# Patient Record
(demographics unavailable — no encounter records)

---

## 2024-11-22 NOTE — ASSESSMENT
[FreeTextEntry1] : Patient is a 32 F with history of PCOS, T2D here for follow up.  # T2D   - goal is <6-6.5 for preconception patient  - A1c 8.5% 2024--> 6.1 2024 - Complications:  none - Aspirin: no - Most recent urine microalbumin   negative 2024   . ACE-I/ARB: no - Most recent LDL: 110   . On statin: NO  - Opthalmology up to date: no,  advised for yearly check up - Podiatry up to date:no advised for yearly check up - Patient to call for persistent glucose < 70 or > 300 - Patient counseled on the importance of consistent carbohydrate diet and regular physical activity  - Advised patient to continue checking FSG and to bring meter to all visits  - Symptoms of hypoglycemia discussed - Current medications for diabetes: - Continue with mounjaro 10 mg weekly  - Patient having some mild weight loss, she says she prefers to stay on the current dose since she is trying out a new diet.  Patient understands completely that Mounjaro is not safe in pregnancy she states that she is not currently trying to conceive.   - Fasting glucose sometimes above goal likely related to patient eating late dinner, discussed with her to finish dinner earlier around 8 pm  # Preconception care - Patient currently does  meet the glucose targets needed for pregnancy for women with diabetes. I reviewed the glucose targets with the patient, including finger stick glucose of 63-95 premeals and < 140 1 hour after meals). Target A1C should be <6% prior to pregnancy.  - Patient currently educated on the risks of high blood sugars affecting the mother and the fetus. I reviewed the risks of hyperglycemia to the fetus and hypo and hyperglycemia to the mother including but not limited to macrosoma, placental insufficiency, delayed fetal lung maturity, and  death.  All of these risks are increased with high blood glucose levels. Patient is encouraged to let me and the health team know if she is planning pregnancy in the next year and discuss a management plan prior to pregnancy to ensure the best health for both the mother and the fetus. Since at this time, patient currently does not meet the glucose targets needed for pregnancy for women with diabetes, I stress the importance of using contraception and encouraged her to go to her GYN for possible IUD insertion, this is more ideal than OCP (which obesity already puts her at higher risk for PE/DVT) - I also discussed that insulin and metformin have been most commonly used during pregnancy so if the patient becomes pregnant and is on other medications she must contact her health team immediately to be switched to insulin. Other medications that are teratogenic include ACEI and ARB and will need to be stopped prior to pregnancy. Patient will need eye exam every trimester and will need close follow up with her OBGYN. .  # HLD -  2024 with  -Due to trying to conceive patient was previously on rosuvastatin 10 mg once daily but this was stopped.   # PCOS - Currently having monthly menses   # Class 3 obesity  - BMI >40 - Was 220 highest, now 210 lb  - Mounjaro as above   FOLLOW UP: 3-4 months with nellie and then with me  Cyndi Landeros MD Endocrinology, Diabetes and Metabolism [address] [place] [address]. [address] [place] [address] Tel [phone] Fax [phone]

## 2024-11-22 NOTE — PHYSICAL EXAM
[Alert] : alert [Well Nourished] : well nourished [No Acute Distress] : no acute distress [No Respiratory Distress] : no respiratory distress [No Accessory Muscle Use] : no accessory muscle use [Clear to Auscultation] : lungs were clear to auscultation bilaterally [Normal PMI] : the apical impulse was normal [Normal S1, S2] : normal S1 and S2 [Normal Rate] : heart rate was normal [Oriented x3] : oriented to person, place, and time [Normal Insight/Judgement] : insight and judgment were intact

## 2024-11-22 NOTE — HISTORY OF PRESENT ILLNESS
[FreeTextEntry1] : Patient is a 32 F with history of PCOS, T2D here for follow up. Last seen Keke 08/05/2024.   FH: dad has diabetes SH: denies smoking, alcohol use  Menstrual history: having monthly menstrual cycle.  have 1 daughter 12 years old. Sexually active, not using protection.  Patient is currently not trying to get pregnant, she is going to wait at least 6 months to try again.  She states that she wants to lose weight first.  #  T2DM: Diabetes history: Diagnosed  1 year ago.    Most recent A1C 8.1 12/14/2022--> 8.0 05/2023--> 5.4 2/1/2024--> 8.5% 8/2/2024--> 6.1 11/22/2024  Diabetes complications: Neuropathy: denies  Retinopathy: denies (last eye exam every 2 years) Nephropathy: denies (most recent Cr 0.46, ) CAD/MI/CVA: denies   Current DM medications:  Mounjaro 10 mg once weekly.   Past DM medications:  Basaglar 20 units nightly Metformin  mg 2 tablets twice daily  Finger sticks:  Fasting 125, 125, 130, 128, 119, 101, 105, 106, 106, 147 Postprandial 91, 111, 101, 102, 100, 105, 98, 102, 73, 103  Diet:  Breakfast: bread, coffee, bread, croissant  Lunch: rice, chicken, broccolli, soup Dinner: tortilla, chicken Snacks: none  Drinks: seltzer water, soda diet   Exercise:  no   # HTN Blood pressure today 102/70 mmHg  # HLD   08/2024 with  -Due to trying to conceive patient was previously on rosuvastatin 10 mg once daily but this was stopped.  # Vitamin D deficiency  - Was taking vitamin D 2000 IU daily   # PCOS  Was having period every 3 months and was diagnosed 5 years ago. Also had hair growth on her chins and on the side of face.  No prior documented testosterone level.   # Infertility  Previously underwent workup in 2020.  Prolactin wnl  FSH 5.0  # Preconception care Patient reports she is menstruating. Menses are regular.  Patient reports she is sexually active with male partners. For contraception, she is currently  not using contraception.  Patient is considering pregnancy in the next year.  Patient is  taking folic acid and prenatal vitamins.   2/1/2024 visit events: doing well. Menses are regular every month. Actively trying to get pregnant.   8/5/2024: Mounjaro was stopped at the last visit, A1c markedly elevated 8.5%. Diagnosed with a myoma, was having heavy menses and blood clots. She resumed Mounjaro 7.5 mg once weekly.  Patient is no longer trying to conceive.  11/22/2024 visit events: trying to conceive in the next year, but wants to lose weight first. She is trying out a new diet and trying to drink more fluid.

## 2025-02-05 NOTE — HISTORY OF PRESENT ILLNESS
[FreeTextEntry1] : Patient is a 32 F with history of PCOS, T2D here for follow up. Last seen Keke 08/05/2024.   FH: dad has diabetes SH: denies smoking, alcohol use  Menstrual history: having monthly menstrual cycle.  Has 1 daughter 12 years old. Sexually active, not using protection.  Patient is currently not trying to get pregnant, she is going to wait at least 6 months to try again.  She states that she wants to lose weight first.  #  T2DM: Diabetes history: Diagnosed  1 year ago.    Most recent A1C 8.1 12/14/2022--> 8.0 05/2023--> 5.4 2/1/2024--> 8.5% 8/2/2024--> 6.1 11/22/2024 -A1c 2/3/25: 6.0%  Diabetes complications: Neuropathy: denies  Retinopathy: denies (last eye exam every 2 years) Nephropathy: denies (most recent Cr 0.46, ) CAD/MI/CVA: denies   Current DM medications:  Mounjaro 10 mg once weekly.   Past DM medications:  Basaglar 20 units nightly Metformin  mg 2 tablets twice daily  Finger sticks:  Fasting [phone]- Postprandial -883-  Diet:  Breakfast: bread, coffee, bread, croissant  Lunch: rice, chicken, broccolli, soup Dinner: tortilla, chicken Snacks: none  Drinks: seltzer water, soda diet   Exercise:  no   # HLD   08/2024 with  -Due to trying to conceive patient was previously on rosuvastatin 10 mg once daily but this was stopped.  # Vitamin D deficiency  - Was taking vitamin D 2000 IU daily   # PCOS  Was having period every 3 months and was diagnosed 5 years ago. Also had hair growth on her chins and on the side of face.  No prior documented testosterone level.   # Infertility  Previously underwent workup in 2020.  Prolactin wnl  FSH 5.0  # Preconception care Patient reports she is menstruating. Menses are regular.  Patient reports she is sexually active with male partners. For contraception, she is currently  not using contraception.  Patient is considering pregnancy in the next year.  Patient is  taking folic acid and prenatal vitamins.   2/1/2024 visit events: doing well. Menses are regular every month. Actively trying to get pregnant.   8/5/2024: Mounjaro was stopped at the last visit, A1c markedly elevated 8.5%. Diagnosed with a myoma, was having heavy menses and blood clots. She resumed Mounjaro 7.5 mg once weekly.  Patient is no longer trying to conceive.  11/22/2024 visit events: trying to conceive in the next year, but wants to lose weight first. She is trying out a new diet and trying to drink more fluid.

## 2025-02-05 NOTE — ASSESSMENT
[FreeTextEntry1] : Patient is a 32 F with history of PCOS, T2D here for follow up.  # T2D   - goal is <6-6.5 for preconception patient  - A1c 8.5% 2024--> 6.1 2024 -A1c 2/3/25: 6.0%, at goal.   Plan: Mounjaro 10 mg once weekly - Patient having some mild weight loss, she says she prefers to stay on the current dose since she is trying out a new diet.  Patient understands completely that Mounjaro is not safe in pregnancy she states that she is not currently trying to conceive.   - Fasting glucose sometimes above goal likely related to patient eating late dinner, discussed with her to finish dinner earlier around 8 pm  - Complications:  none - Aspirin: no - Most recent urine microalbumin   negative 2024   . ACE-I/ARB: no - Most recent LDL: 110   . On statin: NO  - Opthalmology up to date: no,  advised for yearly check up - Podiatry up to date:no advised for yearly check up - Patient to call for persistent glucose < 70 or > 300 - Patient counseled on the importance of consistent carbohydrate diet and regular physical activity  - Advised patient to continue checking FSG and to bring meter to all visits  - Symptoms of hypoglycemia discussed  # Preconception care - Patient currently does meet the glucose targets needed for pregnancy for women with diabetes. I reviewed the glucose targets with the patient, including finger stick glucose of 63-95 premeals and < 140 1 hour after meals). Target A1C should be <6% prior to pregnancy.  - Patient currently educated on the risks of high blood sugars affecting the mother and the fetus. I reviewed the risks of hyperglycemia to the fetus and hypo and hyperglycemia to the mother including but not limited to macrosoma, placental insufficiency, delayed fetal lung maturity, and  death.  All of these risks are increased with high blood glucose levels. Patient is encouraged to let me and the health team know if she is planning pregnancy in the next year and discuss a management plan prior to pregnancy to ensure the best health for both the mother and the fetus. Since at this time, patient currently does not meet the glucose targets needed for pregnancy for women with diabetes, I stress the importance of using contraception and encouraged her to go to her GYN for possible IUD insertion, this is more ideal than OCP (which obesity already puts her at higher risk for PE/DVT) - I also discussed that insulin and metformin have been most commonly used during pregnancy so if the patient becomes pregnant and is on other medications she must contact her health team immediately to be switched to insulin. Other medications that are teratogenic include ACEI and ARB and will need to be stopped prior to pregnancy. Patient will need eye exam every trimester and will need close follow up with her OBGYN. .  # HLD -  24 -Due to trying to conceive patient was previously on rosuvastatin 10 mg once daily but this was stopped.   # PCOS - Currently having monthly menses   # Class 3 obesity  - BMI >40 - Was 220 highest, now 200 lb  - Mounjaro as above   FOLLOW UP: 3-4 months.    I spent over 30 minutes total time with this patient encounter including before, during and after time spent with patient in exam room. Time was spent carefully reviewing the chart in preparing to see patients and performing history & physical exam and ordering appropriate testing and documenting in the medial record.  Additional time was spent counseling the patient regarding my findings and recommendations, recent & current test results as available, any pertinent prior medical records available for review including prior visits, and patients' disease state including risk factor medication.

## 2025-02-06 NOTE — PHYSICAL EXAM
[No Acute Distress] : no acute distress [Well Nourished] : well nourished [Well Developed] : well developed [Normal Sclera/Conjunctiva] : normal sclera/conjunctiva [PERRL] : pupils equal round and reactive to light [EOMI] : extraocular movements intact [Normal Outer Ear/Nose] : the outer ears and nose were normal in appearance [Normal Oropharynx] : the oropharynx was normal [Normal TMs] : both tympanic membranes were normal [Normal Nasal Mucosa] : the nasal mucosa was normal [No JVD] : no jugular venous distention [No Lymphadenopathy] : no lymphadenopathy [Supple] : supple [No Respiratory Distress] : no respiratory distress  [Clear to Auscultation] : lungs were clear to auscultation bilaterally [Normal Rate] : normal rate  [Regular Rhythm] : with a regular rhythm [Normal S1, S2] : normal S1 and S2 [No Carotid Bruits] : no carotid bruits [Pedal Pulses Present] : the pedal pulses are present [No Edema] : there was no peripheral edema [No Extremity Clubbing/Cyanosis] : no extremity clubbing/cyanosis [Normal Appearance] : normal in appearance [No Nipple Discharge] : no nipple discharge [No Axillary Lymphadenopathy] : no axillary lymphadenopathy [Soft] : abdomen soft [Non Tender] : non-tender [Non-distended] : non-distended [No Masses] : no abdominal mass palpated [No HSM] : no HSM [Normal Bowel Sounds] : normal bowel sounds [Normal Supraclavicular Nodes] : no supraclavicular lymphadenopathy [Normal Axillary Nodes] : no axillary lymphadenopathy [Normal Posterior Cervical Nodes] : no posterior cervical lymphadenopathy [Normal Anterior Cervical Nodes] : no anterior cervical lymphadenopathy [No CVA Tenderness] : no CVA  tenderness [No Spinal Tenderness] : no spinal tenderness [No Joint Swelling] : no joint swelling [Grossly Normal Strength/Tone] : grossly normal strength/tone [No Rash] : no rash [Coordination Grossly Intact] : coordination grossly intact [No Focal Deficits] : no focal deficits [Normal Gait] : normal gait [Speech Grossly Normal] : speech grossly normal [Normal Affect] : the affect was normal [Alert and Oriented x3] : oriented to person, place, and time [Normal Mood] : the mood was normal [de-identified] : Obese female in stated age,

## 2025-02-06 NOTE — HISTORY OF PRESENT ILLNESS
[FreeTextEntry1] : Pt presented for PE.  Last PE was in 1year ago. [de-identified] : Her health was uneventful since last visit, she has no new complaint.  Pt had COVID infection in 12/2021, She had 2 doses of COVID vaccine.  She declined Flu vaccine this year.  She saw endocrinologist was started on Mounjaro with dose titration, up to 12.5 mg every week about a month ago.  She lost about 25 lbs during that time.  She was off meds for 8 months in between as she went on Insulin trying to become pregnant.  She is off Insulin now as she needs to lose more weight before she can be pregnant.

## 2025-02-06 NOTE — HEALTH RISK ASSESSMENT
[Very Good] : ~his/her~ current health as very good [Excellent] : ~his/her~  mood as  excellent [Yes] : Yes [2 - 4 times a month (2 pts)] : 2-4 times a month (2 points) [1 or 2 (0 pts)] : 1 or 2 (0 points) [Never (0 pts)] : Never (0 points) [No] : In the past 12 months have you used drugs other than those required for medical reasons? No [No falls in past year] : Patient reported no falls in the past year [Little interest or pleasure doing things] : 1) Little interest or pleasure doing things [Feeling down, depressed, or hopeless] : 2) Feeling down, depressed, or hopeless [0] : 2) Feeling down, depressed, or hopeless: Not at all (0) [PHQ-2 Negative - No further assessment needed] : PHQ-2 Negative - No further assessment needed [Former] : Former [0-4] : 0-4 [< 15 Years] : < 15 Years [NO] : No [None] : None [With Family] : lives with family [# of Members in Household ___] :  household currently consist of [unfilled] member(s) [Employed] : employed [High School] : high school [] :  [# Of Children ___] : has [unfilled] children [Feels Safe at Home] : Feels safe at home [Fully functional (bathing, dressing, toileting, transferring, walking, feeding)] : Fully functional (bathing, dressing, toileting, transferring, walking, feeding) [Fully functional (using the telephone, shopping, preparing meals, housekeeping, doing laundry, using] : Fully functional and needs no help or supervision to perform IADLs (using the telephone, shopping, preparing meals, housekeeping, doing laundry, using transportation, managing medications and managing finances) [Smoke Detector] : smoke detector [Carbon Monoxide Detector] : carbon monoxide detector [Seat Belt] :  uses seat belt [Audit-CScore] : 2 [de-identified] : Cardio exercies for 40 minutes for 3x per week, [[id number]] : 0 [EyeExamDate] : 09/24 [Change in mental status noted] : No change in mental status noted [Reports changes in hearing] : Reports no changes in hearing [Reports changes in vision] : Reports no changes in vision [Reports changes in dental health] : Reports no changes in dental health [PapSmearDate] : 06/24 [de-identified] : dentist - 12/2024, 2x per year

## 2025-02-06 NOTE — ASSESSMENT
[FreeTextEntry1] : Pt is UTD with  Pap smear.  She was reminded to have routine eye exam and dental care.

## 2025-02-06 NOTE — HISTORY OF PRESENT ILLNESS
[FreeTextEntry1] : Pt presented for PE.  Last PE was in 1year ago. [de-identified] : Her health was uneventful since last visit, she has no new complaint.  Pt had COVID infection in 12/2021, She had 2 doses of COVID vaccine.  She declined Flu vaccine this year.  She saw endocrinologist was started on Mounjaro with dose titration, up to 12.5 mg every week about a month ago.  She lost about 25 lbs during that time.  She was off meds for 8 months in between as she went on Insulin trying to become pregnant.  She is off Insulin now as she needs to lose more weight before she can be pregnant.

## 2025-02-06 NOTE — HEALTH RISK ASSESSMENT
[Very Good] : ~his/her~ current health as very good [Excellent] : ~his/her~  mood as  excellent [Yes] : Yes [2 - 4 times a month (2 pts)] : 2-4 times a month (2 points) [1 or 2 (0 pts)] : 1 or 2 (0 points) [Never (0 pts)] : Never (0 points) [No] : In the past 12 months have you used drugs other than those required for medical reasons? No [No falls in past year] : Patient reported no falls in the past year [Little interest or pleasure doing things] : 1) Little interest or pleasure doing things [Feeling down, depressed, or hopeless] : 2) Feeling down, depressed, or hopeless [0] : 2) Feeling down, depressed, or hopeless: Not at all (0) [PHQ-2 Negative - No further assessment needed] : PHQ-2 Negative - No further assessment needed [Former] : Former [0-4] : 0-4 [< 15 Years] : < 15 Years [NO] : No [None] : None [With Family] : lives with family [# of Members in Household ___] :  household currently consist of [unfilled] member(s) [Employed] : employed [High School] : high school [] :  [# Of Children ___] : has [unfilled] children [Feels Safe at Home] : Feels safe at home [Fully functional (bathing, dressing, toileting, transferring, walking, feeding)] : Fully functional (bathing, dressing, toileting, transferring, walking, feeding) [Fully functional (using the telephone, shopping, preparing meals, housekeeping, doing laundry, using] : Fully functional and needs no help or supervision to perform IADLs (using the telephone, shopping, preparing meals, housekeeping, doing laundry, using transportation, managing medications and managing finances) [Smoke Detector] : smoke detector [Carbon Monoxide Detector] : carbon monoxide detector [Seat Belt] :  uses seat belt [Audit-CScore] : 2 [de-identified] : Cardio exercies for 40 minutes for 3x per week, [[id number]] : 0 [EyeExamDate] : 09/24 [Change in mental status noted] : No change in mental status noted [Reports changes in hearing] : Reports no changes in hearing [Reports changes in vision] : Reports no changes in vision [Reports changes in dental health] : Reports no changes in dental health [PapSmearDate] : 06/24 [de-identified] : dentist - 12/2024, 2x per year

## 2025-02-06 NOTE — REVIEW OF SYSTEMS
[Recent Change In Weight] : ~T recent weight change [Incontinence] : incontinence [Negative] : Heme/Lymph [Fever] : no fever [Chills] : no chills [Fatigue] : no fatigue [Chest Pain] : no chest pain [Palpitations] : no palpitations [Lower Ext Edema] : no lower extremity edema [Shortness Of Breath] : no shortness of breath [Wheezing] : no wheezing [Cough] : no cough [Dyspnea on Exertion] : no dyspnea on exertion [Abdominal Pain] : no abdominal pain [Nausea] : no nausea [Constipation] : no constipation [Diarrhea] : diarrhea [Vomiting] : no vomiting [Heartburn] : no heartburn [Melena] : no melena [Dysuria] : no dysuria [Nocturia] : no nocturia [Hematuria] : no hematuria [Joint Pain] : no joint pain [Joint Stiffness] : no joint stiffness [Muscle Pain] : no muscle pain [Back Pain] : no back pain [Itching] : no itching [Mole Changes] : no mole changes [Skin Rash] : no skin rash [Headache] : no headache [Dizziness] : no dizziness [Fainting] : no fainting [Unsteady Walking] : no ataxia [Insomnia] : no insomnia [Anxiety] : no anxiety [Depression] : no depression [Easy Bleeding] : no easy bleeding [Easy Bruising] : no easy bruising [FreeTextEntry2] : Pt is on Mounjaro and lost some weight over the past year, [FreeTextEntry3] : wears corrective lens,

## 2025-05-23 NOTE — HISTORY OF PRESENT ILLNESS
[FreeTextEntry1] : Patient is a 33 F with history of PCOS, T2D here for follow up.  ________________________________________________________________________________________ 2024 visit events: doing well. Menses are regular every month. Actively trying to get pregnant.   2024: Mounjaro was stopped at the last visit, A1c markedly elevated 8.5%. Diagnosed with a myoma, was having heavy menses and blood clots. She resumed Mounjaro 7.5 mg once weekly.  Patient is no longer trying to conceive.  2024 visit events: trying to conceive in the next year, but wants to lose weight first. She is trying out a new diet and trying to drink more fluid.   25: Patient is doing well, with excellent glucose control with A1c of 5.3%, now on Mounjaro 12.5 mg once weekly.  Feels well at this dose.  Is happy with her weight loss so far.  She is not currently trying to conceive. _________________________________________________________________________________________  FH: dad has diabetes SH: denies smoking, alcohol use  Menstrual history: having monthly menstrual cycle.  Has 1 daughter 12 years old. Sexually active, not using protection.  Patient is currently not trying to get pregnant, she is going to wait at least 6 months to try again.  She states that she wants to lose weight first.  #  T2DM: Diabetes history: Diagnosed  1 year ago.    Most recent A1C 8.1 2022--> 8.0 2023--> 5.4 2024--> 8.5% 2024--> 6.1 2024 -A1c 2/3/25: 6.0% A1c 2025: 5.3%  Diabetes complications: Neuropathy: denies  Retinopathy: denies (last eye exam every 2 years) Nephropathy: denies (most recent Cr 0.46, ) CAD/MI/CVA: denies   Current DM medications:  Mounjaro 12.5 mg once weekly  Past DM medications:  Basaglar 20 units nightly Metformin  mg 2 tablets twice daily  Finger sticks:  See scanned glucose logs: [id number]- Afternoon checks: 85-75-12-22-31---106  Diet:  Breakfast: bread, coffee, bread, croissant  Lunch: rice, chicken, broccolli, soup Dinner: tortilla, chicken Snacks: none  Drinks: seltzer water, soda diet   Exercise: Yes, exercises 3 to 5 days/week. Patient states she has really increased her activity and improved her diet.  # HLD , 2/3/2025.  Focus on weight reduction and dietary changes. -Due to trying to conceive patient was previously on rosuvastatin 10 mg once daily but this was stopped.  # Vitamin D deficiency  - Was taking vitamin D 2000 IU daily   # PCOS  Was having period every 3 months and was diagnosed 5 years ago. Also had hair growth on her chins and on the side of face.  No prior documented testosterone level.   # Infertility  Previously underwent workup in .  Prolactin wnl  FSH 5.0  # Preconception care Patient reports she is menstruating. Menses are regular.  Patient reports she is sexually active with male partners. For contraception, she is currently  not using contraception.  Patient is considering pregnancy in the next year.  Patient is  taking folic acid and prenatal vitamins.

## 2025-05-23 NOTE — ASSESSMENT
[FreeTextEntry1] : Patient is a 33 F with history of PCOS, T2D here for follow up.  # T2D   - goal is <6-6.5 for preconception patient  - A1c 8.5% 2024--> 6.1 2024 -A1c 2/3/25: 6.0%, at goal.  A1c 25: 5.3%  Plan: Discussed with patient plan to continue Mounjaro 12.5 mg once weekly, discussed increased dose to 15 mg in the event that she feels as though the effects of the medication are wearing off.  I would not recommend that she abruptly stopped GLP therapy prior to trying to conceive, the plan should be that we target her to her goal weight of 150 pounds and thereafter titrate down gradually, once she is trying to conceive we will transition her back to metformin and if needed once daily insulin to target her for prepregnancy numbers.   With goal weight of 150 pounds prior to conception.  -  Patient understands completely that Mounjaro is not safe in pregnancy she states that she is not currently trying to conceive.    - Complications:  none - Aspirin: no - Most recent urine microalbumin   negative 2024   . ACE-I/ARB: no - Most recent LDL: 115  . On statin: NO  - Opthalmology up to date: no,  advised for yearly check up - Podiatry up to date:no advised for yearly check up - Patient to call for persistent glucose < 70 or > 300 - Patient counseled on the importance of consistent carbohydrate diet and regular physical activity  - Advised patient to continue checking FSG and to bring meter to all visits  - Symptoms of hypoglycemia discussed  # Preconception care - Patient currently does meet the glucose targets needed for pregnancy for women with diabetes. I reviewed the glucose targets with the patient, including finger stick glucose of 63-95 premeals and < 140 1 hour after meals). Target A1C should be <6% prior to pregnancy.  - Patient currently educated on the risks of high blood sugars affecting the mother and the fetus. I reviewed the risks of hyperglycemia to the fetus and hypo and hyperglycemia to the mother including but not limited to macrosoma, placental insufficiency, delayed fetal lung maturity, and  death.  All of these risks are increased with high blood glucose levels. Patient is encouraged to let me and the health team know if she is planning pregnancy in the next year and discuss a management plan prior to pregnancy to ensure the best health for both the mother and the fetus. Since at this time, patient currently does not meet the glucose targets needed for pregnancy for women with diabetes, I stress the importance of using contraception and encouraged her to go to her GYN for possible IUD insertion, this is more ideal than OCP (which obesity already puts her at higher risk for PE/DVT) - I also discussed that insulin and metformin have been most commonly used during pregnancy so if the patient becomes pregnant and is on other medications she must contact her health team immediately to be switched to insulin. Other medications that are teratogenic include ACEI and ARB and will need to be stopped prior to pregnancy. Patient will need eye exam every trimester and will need close follow up with her OBGYN. .  # HLD -Due to trying to conceive patient was previously on rosuvastatin 10 mg once daily but this was stopped.   # PCOS - Currently having monthly menses   # Class 3 obesity  - BMI >40 - Was 220 highest, now 200 lb  - Mounjaro as above   FOLLOW UP: 3-4 months.    I spent over 30 minutes total time with this patient encounter including before, during and after time spent with patient in exam room. Time was spent carefully reviewing the chart in preparing to see patients and performing history & physical exam and ordering appropriate testing and documenting in the medial record.  Additional time was spent counseling the patient regarding my findings and recommendations, recent & current test results as available, any pertinent prior medical records available for review including prior visits, and patients' disease state including risk factor medication.